# Patient Record
Sex: MALE | Race: WHITE | NOT HISPANIC OR LATINO
[De-identification: names, ages, dates, MRNs, and addresses within clinical notes are randomized per-mention and may not be internally consistent; named-entity substitution may affect disease eponyms.]

---

## 2019-04-16 ENCOUNTER — TRANSCRIPTION ENCOUNTER (OUTPATIENT)
Age: 34
End: 2019-04-16

## 2020-02-05 ENCOUNTER — TRANSCRIPTION ENCOUNTER (OUTPATIENT)
Age: 35
End: 2020-02-05

## 2020-04-28 ENCOUNTER — TRANSCRIPTION ENCOUNTER (OUTPATIENT)
Age: 35
End: 2020-04-28

## 2020-05-22 ENCOUNTER — TRANSCRIPTION ENCOUNTER (OUTPATIENT)
Age: 35
End: 2020-05-22

## 2022-06-02 ENCOUNTER — NON-APPOINTMENT (OUTPATIENT)
Age: 37
End: 2022-06-02

## 2023-01-26 PROBLEM — Z00.00 ENCOUNTER FOR PREVENTIVE HEALTH EXAMINATION: Status: ACTIVE | Noted: 2023-01-26

## 2023-01-27 ENCOUNTER — APPOINTMENT (OUTPATIENT)
Dept: ORTHOPEDIC SURGERY | Facility: CLINIC | Age: 38
End: 2023-01-27
Payer: COMMERCIAL

## 2023-01-27 VITALS — HEIGHT: 73 IN | WEIGHT: 245 LBS | BODY MASS INDEX: 32.47 KG/M2

## 2023-01-27 DIAGNOSIS — M25.851 OTHER SPECIFIED JOINT DISORDERS, RIGHT HIP: ICD-10-CM

## 2023-01-27 DIAGNOSIS — Z78.9 OTHER SPECIFIED HEALTH STATUS: ICD-10-CM

## 2023-01-27 DIAGNOSIS — M62.838 OTHER MUSCLE SPASM: ICD-10-CM

## 2023-01-27 DIAGNOSIS — M25.852 OTHER SPECIFIED JOINT DISORDERS, LEFT HIP: ICD-10-CM

## 2023-01-27 PROCEDURE — 99203 OFFICE O/P NEW LOW 30 MIN: CPT

## 2023-01-27 PROCEDURE — 73522 X-RAY EXAM HIPS BI 3-4 VIEWS: CPT

## 2023-01-27 PROCEDURE — 72040 X-RAY EXAM NECK SPINE 2-3 VW: CPT

## 2023-01-27 NOTE — HISTORY OF PRESENT ILLNESS
[Gradual] : gradual [7] : 7 [1] : 2 [Dull/Aching] : dull/aching [Localized] : localized [Tightness] : tightness [Intermittent] : intermittent [Leisure] : leisure [Rest] : rest [de-identified] : 36 yo M here for eval of R sided neck pain and BL hips (L>R). Neck pain for the past few months, began after jumping on trampoline. No radicular sx. Pain is sore/ discomfort. Stiffness with rom. \par Hip pain is intermittent. Sharp pain comes on randomly with certain movements. \par no tx [] : Post Surgical Visit: no [FreeTextEntry1] : mini hips/neck [FreeTextEntry5] : No known injury. Patient has been feeling bilateral hip for the last 2 years [FreeTextEntry6] : discomfort/soreness [de-identified] : activity

## 2023-01-27 NOTE — IMAGING
[Bilateral] : hip with pelvis bilaterally [There are no fractures, subluxations or dislocations. No significant abnormalities are seen] : There are no fractures, subluxations or dislocations. No significant abnormalities are seen [Cross over sign] : Cross over sign [de-identified] : \par ----------------------------------------------------------------------------\par \par Bilateral hip exam: \par \par Inspection: no deformity, no swelling, no gross limb length discrepancy\par ROM: \par    Flexion: 100\par    ER: 40\par    IR: 20\par Tenderness: \par    (-) Groin tenderness\par    +Greater trochanteric tenderness\par    (-) Buttock tenderness\par    (-) IT Band tenderness\par    (-) Anterior thigh tenderness\par    (-) ASIS tenderness\par    (-) Ischial tuberosity\par    (-) Hamstring muscle tenderness\par Additional tests: \par    (+)min FADIR\par    (-) MARICRUZ\par    (-) Resisted hip flexion pain\par    (-) Apprehension (external rotation/extension)\par    (-) Posterior pain with forced hip flexion and knee extension\par    (+) Tight hamstrings\par    (-) Log roll\par    (-) Axial load\par Strength: 5/5 IP/Q/H/TA/GS/EHL\par Neuro: In tact to light touch throughout, DTR's wnl\par Vascularity: Extremity warm and well perfused\par Gait: normal.\par  \par \par ----------------------------------------------------------------------------\par \par Cervical spine exam: \par \par Inspection:        (-) Abnormal alignment (kyphosis/lordosis)   (-) Atrophy \par ROM: \par    Pain:               (-) Flexion/extension     (+) Rotation\par    Stiffness:       (-) Flexion/extension     (+) Rotation\par Tenderness: \par    Trapezial:       (+) Right    (-) Left    (-) Midline\par    Paraspinal:     (+) Right    (-) Left \par    Rhomboid :     (-) Right    (-) Left \par    Scapula:         (-) Right    (-) Left \par Strength: \par    Deltoid:          Right: 5/5   .  Left: 5/5\par    Biceps:          Right: 5/5   .  Left: 5/5\par    Triceps:         Right: 5/5   .  Left: 5/5\par    Wrist flex      Right: 5/5   .   Left: 5/5\par    Wrist ext:      Right: 5/5   .   Left: 5/5\par    Hand:            Right: 5/5   .   Left: 5/5\par Neuro: DTR's wnl.  Sensation to light touch grossly in tact in all distributions. \par    (-) Kiko's\par    (-) Spurling\par    (-) Lhermitte\par Vascularity: Extremity warm and well perfused\par Gait: normal\par   [Straightening consistent with spasm] : Straightening consistent with spasm [FreeTextEntry1] : mild listhesis

## 2023-01-27 NOTE — DISCUSSION/SUMMARY
[de-identified] : right trap spasm, \par bilatearl hip impigement. \par PT for now. \par muscle relaxer. \par fu 6 wk\par \par ----------------------------------------------------------------------------\par \par The patient was advised of the diagnosis.  The natural history of the pathology was explained in full. All questions were answered.  The risks and benefits of conservative and interventional treatment alternatives were explained to the patient\par

## 2023-06-09 ENCOUNTER — APPOINTMENT (OUTPATIENT)
Dept: UROLOGY | Facility: CLINIC | Age: 38
End: 2023-06-09
Payer: COMMERCIAL

## 2023-06-09 VITALS
HEART RATE: 96 BPM | TEMPERATURE: 95.5 F | DIASTOLIC BLOOD PRESSURE: 88 MMHG | WEIGHT: 245 LBS | BODY MASS INDEX: 32.47 KG/M2 | HEIGHT: 73 IN | SYSTOLIC BLOOD PRESSURE: 123 MMHG | OXYGEN SATURATION: 95 %

## 2023-06-09 DIAGNOSIS — Z78.9 OTHER SPECIFIED HEALTH STATUS: ICD-10-CM

## 2023-06-09 DIAGNOSIS — N28.1 CYST OF KIDNEY, ACQUIRED: ICD-10-CM

## 2023-06-09 DIAGNOSIS — Z80.42 FAMILY HISTORY OF MALIGNANT NEOPLASM OF PROSTATE: ICD-10-CM

## 2023-06-09 PROCEDURE — 99204 OFFICE O/P NEW MOD 45 MIN: CPT

## 2023-06-09 NOTE — ASSESSMENT
[FreeTextEntry1] : He does not have any significant neurological symptoms.  Based on office ultrasound today his prostate does not appear to be enlarged.  Simple renal cysts are sacs filled with fluid. The exact cause of renal cysts is unknown but cysts are more common with advancing age. Up to one third of patients over age 70 have renal cysts. Having many renal cysts is different than polycystic renal disease. Simple renal cysts are usually found incidentally with imaging studies (US, CT scan or MRI), are usually asymptomatic and do not require any treatment. Very large renal cysts can cause dull pain or discomfort. Cysts can rarely become infected, develop bleeding, rupture or impair renal function. Surgical removal or drainage and sclerotherapy of renal cysts can be performed in specific clinical settings. Bosniak classification of renal cysts into 5 categories was reviewed: simple (category I), minimally complex (category II and IIF), indeterminate (category III) and solid (category IV). Malignancy risk, treatment and follow-up of renal cysts based on category were discussed. Patient's questions were answered.  No further intervention or follow-up is needed for small renal cysts.\par \par Den Calvo MD, FACS\par [facility]\par  of Urology\par \par [address], [address]\[place] [address]\par \par [address], [address]\[place] [address]\par \par Tel: [phone]\par Fax: [phone]

## 2023-06-09 NOTE — PHYSICAL EXAM
[General Appearance - Well Developed] : well developed [General Appearance - Well Nourished] : well nourished [Normal Appearance] : normal appearance [Well Groomed] : well groomed [General Appearance - In No Acute Distress] : no acute distress [Edema] : no peripheral edema [Respiration, Rhythm And Depth] : normal respiratory rhythm and effort [Exaggerated Use Of Accessory Muscles For Inspiration] : no accessory muscle use [Abdomen Soft] : soft [Abdomen Tenderness] : non-tender [Costovertebral Angle Tenderness] : no ~M costovertebral angle tenderness [Urethral Meatus] : meatus normal [Penis Abnormality] : normal uncircumcised penis [Urinary Bladder Findings] : the bladder was normal on palpation [Scrotum] : the scrotum was normal [Testes Tenderness] : no tenderness of the testes [Testes Mass (___cm)] : there were no testicular masses [Normal Station and Gait] : the gait and station were normal for the patient's age [] : no rash [No Focal Deficits] : no focal deficits [Oriented To Time, Place, And Person] : oriented to person, place, and time [Affect] : the affect was normal [Mood] : the mood was normal [Not Anxious] : not anxious [No Palpable Adenopathy] : no palpable adenopathy

## 2023-06-09 NOTE — HISTORY OF PRESENT ILLNESS
[FreeTextEntry1] : He is a 38-year-old man who is seen today for initial visit.  He generally does not have significant urinary symptoms.  There is no flank pain.  He is here to discuss the results of renal ultrasound.  Renal ultrasound in December 2022 from [facility] radiology showed left renal 1 cm cyst, enlarged prostate about 50 g and minimal residual urine volume.  Based on office ultrasound today and the prostate appears small.

## 2023-06-09 NOTE — LETTER BODY
[Dear  ___] : Dear  [unfilled], [Consult Letter:] : I had the pleasure of evaluating your patient, [unfilled]. [Consult Closing:] : Thank you very much for allowing me to participate in the care of this patient.  If you have any questions, please do not hesitate to contact me. [FreeTextEntry1] : [address] [place] [address] \par [phone]